# Patient Record
Sex: MALE | Race: AMERICAN INDIAN OR ALASKA NATIVE
[De-identification: names, ages, dates, MRNs, and addresses within clinical notes are randomized per-mention and may not be internally consistent; named-entity substitution may affect disease eponyms.]

---

## 2020-09-13 ENCOUNTER — HOSPITAL ENCOUNTER (EMERGENCY)
Dept: HOSPITAL 5 - ED | Age: 45
LOS: 1 days | Discharge: HOME | End: 2020-09-14
Payer: SELF-PAY

## 2020-09-13 DIAGNOSIS — Z79.4: ICD-10-CM

## 2020-09-13 DIAGNOSIS — F17.200: ICD-10-CM

## 2020-09-13 DIAGNOSIS — I10: ICD-10-CM

## 2020-09-13 DIAGNOSIS — E11.621: Primary | ICD-10-CM

## 2020-09-13 PROCEDURE — 82962 GLUCOSE BLOOD TEST: CPT

## 2020-09-13 PROCEDURE — 99283 EMERGENCY DEPT VISIT LOW MDM: CPT

## 2020-09-13 PROCEDURE — 36415 COLL VENOUS BLD VENIPUNCTURE: CPT

## 2020-09-13 PROCEDURE — 85025 COMPLETE CBC W/AUTO DIFF WBC: CPT

## 2020-09-13 PROCEDURE — 80053 COMPREHEN METABOLIC PANEL: CPT

## 2020-09-14 VITALS — DIASTOLIC BLOOD PRESSURE: 90 MMHG | SYSTOLIC BLOOD PRESSURE: 147 MMHG

## 2020-09-14 LAB
ALBUMIN SERPL-MCNC: 3.9 G/DL (ref 3.9–5)
ALT SERPL-CCNC: 21 UNITS/L (ref 7–56)
BASOPHILS # (AUTO): 0 K/MM3 (ref 0–0.1)
BASOPHILS NFR BLD AUTO: 0.3 % (ref 0–1.8)
BUN SERPL-MCNC: 14 MG/DL (ref 9–20)
BUN/CREAT SERPL: 16 %
CALCIUM SERPL-MCNC: 9.4 MG/DL (ref 8.4–10.2)
EOSINOPHIL # BLD AUTO: 0 K/MM3 (ref 0–0.4)
EOSINOPHIL NFR BLD AUTO: 0.3 % (ref 0–4.3)
HCT VFR BLD CALC: 32.1 % (ref 35.5–45.6)
HEMOLYSIS INDEX: 3
HGB BLD-MCNC: 10.8 GM/DL (ref 11.8–15.2)
LYMPHOCYTES # BLD AUTO: 2.4 K/MM3 (ref 1.2–5.4)
LYMPHOCYTES NFR BLD AUTO: 20.8 % (ref 13.4–35)
MCHC RBC AUTO-ENTMCNC: 34 % (ref 32–34)
MCV RBC AUTO: 91 FL (ref 84–94)
MONOCYTES # (AUTO): 1 K/MM3 (ref 0–0.8)
MONOCYTES % (AUTO): 9.1 % (ref 0–7.3)
PLATELET # BLD: 268 K/MM3 (ref 140–440)
RBC # BLD AUTO: 3.51 M/MM3 (ref 3.65–5.03)

## 2020-09-14 NOTE — EMERGENCY DEPARTMENT REPORT
ED General Adult HPI





- General


Chief complaint: Weakness


Stated complaint: WEAKNESS


Time Seen by Provider: 20 01:16


Source: patient


Mode of arrival: Ambulatory


Limitations: No Limitations





- History of Present Illness


Initial comments: 





44-year-old -American male with a significant past medical history of 

diabetes hypertension.  Surgical history of left foot second and third toe 

amputation.  Patient presents to the emergency room complaining of generalized 

weakness for the last 2 days with bilateral feet pain 7 out of 10 with wounds on

his feet.  Patient reports that his left forearm on his foot has been there for 

2 months and has been taking care of it on his own.  Patient reports right wound

has been there for 1 week.  Patient reports no fever, no shortness of breath, no

nausea no vomiting.  Patient reports that he has soreness that runs down the 

inside of his right leg from his groin area.  Patient denies any urinary issues 

denies any penile discharge no testicular pain does admit to erectile 

dysfunction for years.  Patient reports to me he comes in for medication refill 

he is uninsured and has not been to his primary care doctor in several months 

since losing his job.  Patient states that he is currently takes metformin 1000 

mg twice a day.  He is also been taking over-the-counter supplements, pill call 

blood sugar herbs and [place] for his blood pressure.  Patient states in the 

past he was on lisinopril 20 mg daily and hydrochlorothiazide unknown dose.


Onset/Timin


-: days(s), week(s) (1 week right wound on foot), month(s) (2 months for left 

wound)


Location: lower extremity


Severity scale (0 -10): 7


Quality: stabbing, sharp


Consistency: intermittent


Improves with: rest


Worsens with: other (Walking)


Associated Symptoms: weakness


Treatments Prior to Arrival: none





- Related Data


                                  Previous Rx's











 Medication  Instructions  Recorded  Last Taken  Type


 


Clindamycin [Clindamycin CAP] 300 mg PO Q8H 10 Days #30 cap 20 Unknown Rx


 


Losartan/Hydrochlorothiazide 1 each PO QDAY #30 tablet 20 Unknown Rx





[Hyzaar 100-12.5 Tablet]    


 


Metformin HCl [metFORMIN] 1,000 mg PO BID #60 tablet 20 Unknown Rx











                                    Allergies











Allergy/AdvReac Type Severity Reaction Status Date / Time


 


No Known Allergies Allergy   Unverified 20 23:24














ED Review of Systems


ROS: 


Stated complaint: WEAKNESS


Other details as noted in HPI





Comment: All other systems reviewed and negative





ED Past Medical Hx





- Past Medical History


Previous Medical History?: Yes


Hx Hypertension: Yes


Hx Diabetes: Yes





- Surgical History


Past Surgical History?: Yes


Additional Surgical History: Left foot 2nd and 3rd toe amputation





- Social History


Smoking Status: Current Some Day Smoker





- Medications


Home Medications: 


                                Home Medications











 Medication  Instructions  Recorded  Confirmed  Last Taken  Type


 


Clindamycin [Clindamycin CAP] 300 mg PO Q8H 10 Days #30 cap 20  Unknown Rx


 


Losartan/Hydrochlorothiazide 1 each PO QDAY #30 tablet 20  Unknown Rx





[Hyzaar 100-12.5 Tablet]     


 


Metformin HCl [metFORMIN] 1,000 mg PO BID #60 tablet 20  Unknown Rx














ED Physical Exam





- General


Limitations: No Limitations


General appearance: alert, in no apparent distress





- Head


Head exam: Present: atraumatic, normocephalic





- Eye


Eye exam: Present: normal appearance





- ENT


ENT exam: Present: mucous membranes moist





- Neck


Neck exam: Present: normal inspection, full ROM





- Respiratory


Respiratory exam: Present: normal lung sounds bilaterally.  Absent: respiratory 

distress





- Cardiovascular


Cardiovascular Exam: Present: regular rate, tachycardia





- GI/Abdominal


GI/Abdominal exam: Present: soft, normal bowel sounds





- Extremities Exam


Extremities exam: Present: normal inspection, full ROM





- Back Exam


Back exam: Present: normal inspection, full ROM





- Neurological Exam


Neurological exam: Present: alert, oriented X3, normal gait





- Psychiatric


Psychiatric exam: Present: normal affect, normal mood





ED Medical Decision Making





- Medical Decision Making





44-year-old -American male with a significant past medical history of 

diabetes hypertension.  Surgical history of left foot second and third toe amput

ation.  Patient presents to the emergency room complaining of generalized 

weakness for the last 2 days with bilateral feet pain 7 out of 10 with wounds on

 his feet.  Patient reports that his left forearm on his foot has been there for

 2 months and has been taking care of it on his own.  Patient reports right 

wound has been there for 1 week.  Patient reports no fever, no shortness of 

breath, no nausea no vomiting.  Patient reports that he has soreness that runs 

down the inside of his right leg from his groin area.  Patient denies any 

urinary issues denies any penile discharge no testicular pain does admit to 

erectile dysfunction for years.  Patient reports to me he comes in for 

medication refill he is uninsured and has not been to his primary care doctor in

 several months since losing his job.  Patient states that he is currently takes

 metformin 1000 mg twice a day.  He is also been taking over-the-counter 

supplements, pill call blood sugar herbs and [place] for his blood pressure.  

Patient states in the past he was on lisinopril 20 mg daily and 

hydrochlorothiazide unknown dose.  CBC shows patient has a mild elevation of 

WBCs of 11.3, some iron deficiency anemia.  Blood sugars 279.  We will discharge

 patient on clindamycin for diabetic foot with no concerns of osteomyelitis.  

Will refill metformin 1000 mg p.o. twice daily.  Will start patient on Hyzaar 50

 mg daily and hydrochlorothiazide 12.5 mg daily.


Critical care attestation.: 


If time is entered above; I have spent that time in minutes in the direct care 

of this critically ill patient, excluding procedure time.








ED Disposition


Clinical Impression: 


 Diabetic foot ulcer, Diabetes mellitus, Hypertension





Disposition:  TO HOME OR SELFCARE


Is pt being admited?: No


Does the pt Need Aspirin: No


Condition: Stable


Instructions:  Diabetes Mellitus Type 2 in Adults (ED), Hypertension (ED)


Additional Instructions: 


Please complete your antibiotics as prescribed.  Please take your chronic 

medications as prescribed.  Is very important for you to follow-up with a 

primary care provider I have listed several below for your convenience.  Return 

back to the emergency room any worsening issues with your feet.  I would like 

for you to follow-up also with wound care clinic I have listed their information

 below for your convenience.


Prescriptions: 


Clindamycin [Clindamycin CAP] 300 mg PO Q8H 10 Days #30 cap


Losartan/Hydrochlorothiazide [Hyzaar 100-12.5 Tablet] 1 each PO QDAY #30 tablet


Metformin HCl [metFORMIN] 1,000 mg PO BID #60 tablet


Referrals: 


[facility],MD [Primary Care Provider] - 3-5 Days


[facility] [Provider Group] - 3-5 Days


[facility] [Outside] - 3-5 Days